# Patient Record
Sex: FEMALE | Employment: FULL TIME | URBAN - METROPOLITAN AREA
[De-identification: names, ages, dates, MRNs, and addresses within clinical notes are randomized per-mention and may not be internally consistent; named-entity substitution may affect disease eponyms.]

---

## 2017-04-05 PROBLEM — F32.A DEPRESSION: Status: ACTIVE | Noted: 2017-04-05

## 2017-04-05 PROBLEM — F33.2 MAJOR DEPRESSIVE DISORDER, RECURRENT EPISODE, SEVERE (HCC): Status: ACTIVE | Noted: 2017-04-05

## 2017-04-05 PROBLEM — F29 PSYCHOSIS (HCC): Status: ACTIVE | Noted: 2017-04-05

## 2017-04-06 PROBLEM — F31.64 BIPOLAR I DISORDER, MOST RECENT EPISODE MIXED, SEVERE WITH PSYCHOTIC FEATURES (HCC): Status: ACTIVE | Noted: 2017-04-05

## 2017-04-11 PROBLEM — F32.A DEPRESSION: Status: RESOLVED | Noted: 2017-04-05 | Resolved: 2017-04-11

## 2017-04-11 PROBLEM — F31.64 BIPOLAR I DISORDER, MOST RECENT EPISODE MIXED, SEVERE WITH PSYCHOTIC FEATURES (HCC): Status: RESOLVED | Noted: 2017-04-05 | Resolved: 2017-04-11

## 2017-04-11 PROBLEM — F29 PSYCHOSIS (HCC): Status: RESOLVED | Noted: 2017-04-05 | Resolved: 2017-04-11

## 2020-09-21 ENCOUNTER — HOSPITAL ENCOUNTER (OUTPATIENT)
Dept: CT IMAGING | Age: 60
Discharge: HOME | End: 2020-09-21
Attending: PHYSICIAN ASSISTANT
Payer: MEDICARE

## 2020-09-21 DIAGNOSIS — N20.1 CALCULUS OF URETER: ICD-10-CM

## 2020-09-21 PROCEDURE — 74176 CT ABD & PELVIS W/O CONTRAST: CPT

## 2022-12-22 ENCOUNTER — APPOINTMENT (OUTPATIENT)
Dept: ULTRASOUND IMAGING | Age: 62
End: 2022-12-22
Attending: PHYSICIAN ASSISTANT
Payer: MEDICARE

## 2022-12-22 ENCOUNTER — HOSPITAL ENCOUNTER (EMERGENCY)
Age: 62
Discharge: HOME | End: 2022-12-22
Attending: EMERGENCY MEDICINE
Payer: MEDICARE

## 2022-12-22 VITALS
HEART RATE: 65 BPM | HEIGHT: 61 IN | RESPIRATION RATE: 16 BRPM | TEMPERATURE: 98.4 F | OXYGEN SATURATION: 100 % | BODY MASS INDEX: 45.58 KG/M2 | WEIGHT: 241.4 LBS

## 2022-12-22 DIAGNOSIS — I87.2 VENOUS STASIS DERMATITIS OF LEFT LOWER EXTREMITY: Primary | ICD-10-CM

## 2022-12-22 PROCEDURE — 99284 EMERGENCY DEPT VISIT MOD MDM: CPT

## 2022-12-22 PROCEDURE — 93970 EXTREMITY STUDY: CPT

## 2022-12-22 RX ORDER — TRIAMCINOLONE ACETONIDE 1 MG/G
OINTMENT TOPICAL 2 TIMES DAILY
Qty: 30 G | Refills: 2 | Status: SHIPPED | OUTPATIENT
Start: 2022-12-22

## 2022-12-22 NOTE — DISCHARGE INSTRUCTIONS
Start Kenalog cream  Compress lower extremity  Follow up with PCP for wound check in 2-3 days  Return to ED for any new or worsening symptoms

## 2022-12-22 NOTE — ED PROVIDER NOTES
EMERGENCY DEPARTMENT HISTORY AND PHYSICAL EXAM      Date: 12/22/2022  Patient Name: Frank Stein    History of Presenting Illness     Chief Complaint   Patient presents with    Skin Problem     Pt has a large area of darkened skin on her left mid shin x 3 years. Over the past week, it has begun to get red and painful and it is causing left ankle swelling. She called her pcp who rx'd ketoconazole cream, which is not helping. History Provided By: Patient    HPI: Frank Stein, 57 y.o. female presents to the ED with cc of  worsening left lower extremity rash for the past 8 days. The patient states the rash has been present for the past 3 years. She was prescribed Ketoconazole cream 2% by her PCP in September and uses it daily with no improvement. 8 days ago the rash became a more burgandy in color, the patient notes it is typically brownish with thick plaques. The rash is pruritic and painful, described as a \"deep pain\". She states this seems to reoccur every 6-8 months. She denies recent antibiotics use. Recently she has tried Dermawound cleanser and a topical antibiotic ointment that seemed to have caused \"red bumps\" surrounding the wound. Associated symptoms include increased swelling of the left lower extremity. Patient has intermittent numbness of lower extremities secondary to diabetic neuropathy that is unchanged. The patient denies F/C, congestion, cough, SOB, CP, N/V, and increased warmth. The patient denies a history of heart failure. PMH: HTN, DMII   Allergies: Sulfa, PCN, Lisinopril, and Keflex  SH: +marijuana. Pt denies tobacco and alcohol    There are no other complaints, changes, or physical findings at this time. PCP: Markie Solis NP    No current facility-administered medications on file prior to encounter. Current Outpatient Medications on File Prior to Encounter   Medication Sig Dispense Refill    omeprazole (PRILOSEC) 20 mg capsule Take 1 Cap by mouth daily.  20 Cap 0    carBAMazepine (TEGRETOL) 200 mg tablet Take 1 Tab by mouth two (2) times a day. 60 Tab 1    traZODone (DESYREL) 50 mg tablet Take 1 Tab by mouth nightly as needed for Sleep. 30 Tab 1       Past History     Past Medical History:  Past Medical History:   Diagnosis Date    Depression     Diabetes ([address]     Glaucoma     Hypertension        Past Surgical History:  Past Surgical History:   Procedure Laterality Date    HX GYN      BTL       Family History:  No family history on file. Social History:  Social History     Tobacco Use    Smoking status: Former    Smokeless tobacco: Never   Substance Use Topics    Alcohol use: No     Comment: has hx alcholism    Drug use: Yes     Types: Marijuana     Comment: Has hx of crack abuse 14 years ago       Allergies: Allergies   Allergen Reactions    Lisinopril Cough    Pcn [Penicillins] Rash    Sulfa (Sulfonamide Antibiotics) Rash         Review of Systems   Review of Systems   Constitutional: Negative. Negative for chills and fever. HENT: Negative. Negative for congestion. Respiratory:  Negative for cough, chest tightness and shortness of breath. Cardiovascular:  Positive for leg swelling. Negative for chest pain and palpitations. Gastrointestinal: Negative. Negative for abdominal pain, diarrhea, nausea and vomiting. Musculoskeletal: Negative. Negative for arthralgias and myalgias. Skin:  Positive for color change and rash. Allergic/Immunologic: Negative. Negative for environmental allergies and food allergies. Neurological: Negative. Negative for headaches. Psychiatric/Behavioral: Negative. Physical Exam   Physical Exam  Vitals reviewed. Constitutional:       General: She is not in acute distress. Appearance: She is well-developed. She is not diaphoretic. Comments: Pt is awake and alert in NAD. Pt appears non-toxic. HENT:      Head: Normocephalic and atraumatic.       Right Ear: External ear normal.      Left Ear: External ear normal.      Nose: Nose normal.   Eyes:      General:         Right eye: No discharge. Left eye: No discharge. Cardiovascular:      Rate and Rhythm: Normal rate. Pulses: Normal pulses. Dorsalis pedis pulses are 2+ on the right side and 2+ on the left side. Heart sounds: Normal heart sounds. Pulmonary:      Effort: Pulmonary effort is normal. No respiratory distress. Breath sounds: Wheezing (mild, diffuse, inspiratory) present. No rales. Musculoskeletal:         General: No tenderness. Normal range of motion. Right lower leg: Edema present. Left lower leg: Edema (greater edema of LLE compared to RLE) present. Skin:     General: Skin is warm and dry. Coloration: Skin is not pale. Findings: Rash present. No erythema. Comments: Large rectangular rash to anterior left shin. [place] Rower and burgandy in color with patches and excoriation. No increase in warmth. No drainage. No defined borders, but pinpoint erythematous papules are distributed throughout the rash. No surrounding erythema. Neurological:      General: No focal deficit present. Mental Status: She is alert. Motor: No weakness. Gait: Gait normal.      Comments: No focal neuro deficits. Psychiatric:         Behavior: Behavior normal.       Diagnostic Study Results     Labs -   No results found for this or any previous visit (from the past 12 hour(s)). Radiologic Studies -   DUPLEX LOWER EXT VENOUS LEFT    (Results Pending)     CT Results  (Last 48 hours)      None          CXR Results  (Last 48 hours)      None            Medical Decision Making   I am the first provider for this patient. I reviewed the vital signs, available nursing notes, past medical history, past surgical history, family history and social history. Vital Signs-Reviewed the patient's vital signs.   Patient Vitals for the past 12 hrs:   Temp Pulse Resp SpO2   12/22/22 1237 98.4 °F (36.9 °C) 65 16 100 % Records Reviewed: Old Medical Records    Provider Notes (Medical Decision Making):   Jessa Morillo, 57 y.o. female presents to the ED with cc of  worsening left lower extremity rash for the past 8 days. Exam consistent with stasis dermatitis. R/out DVT with increased swelling and pain described as deep. Low suspicion of cellulitis as history and physical exam are not consistent. ED Course:   Initial assessment performed. The patients presenting problems have been discussed, and they are in agreement with the care plan formulated and outlined with them. I have encouraged them to ask questions as they arise throughout their visit. Disposition:  2:27 PM    All of the diagnostic tests were reviewed and questions answered. Diagnosis, care plan and treatment options were discussed. The patient understands the instructions and will follow up as directed. The patients results have been reviewed with them. They have been counseled regarding their diagnosis. The patient verbally convey understanding and agreement of the signs, symptoms, diagnosis, treatment and prognosis and additionally agrees to follow up as recommended with their PCP in 24 - 48 hours. They also agree with the care-plan and convey that all of their questions have been answered. I have provided discharge instructions that include: 1) educational information regarding their diagnosis, 2) how to care for their diagnosis at home, as well a 3) list of reasons why they would want to return to the ED prior to their follow-up appointment, should their condition change. DC-The patient was given verbal follow-up instructions    DISCHARGE PLAN:  1. Current Discharge Medication List        2. Follow-up Information    None       3. Return to ED if worse     Diagnosis     Clinical Impression:   1.  Venous stasis dermatitis of left lower extremity        Attestations:    MEGGAN Moore        Please note that this dictation was completed with Dragon, the computer voice recognition software. Quite often unanticipated grammatical, syntax, homophones, and other interpretive errors are inadvertently transcribed by the computer software. Please disregard these errors. Please excuse any errors that have escaped final proofreading. Thank you.